# Patient Record
Sex: FEMALE | Employment: FULL TIME | URBAN - METROPOLITAN AREA
[De-identification: names, ages, dates, MRNs, and addresses within clinical notes are randomized per-mention and may not be internally consistent; named-entity substitution may affect disease eponyms.]

---

## 2017-11-08 ENCOUNTER — OFFICE VISIT (OUTPATIENT)
Dept: MEDICAL GROUP | Facility: LAB | Age: 27
End: 2017-11-08
Payer: COMMERCIAL

## 2017-11-08 VITALS
HEART RATE: 66 BPM | OXYGEN SATURATION: 97 % | TEMPERATURE: 97.9 F | SYSTOLIC BLOOD PRESSURE: 96 MMHG | RESPIRATION RATE: 12 BRPM | WEIGHT: 155 LBS | BODY MASS INDEX: 28.52 KG/M2 | HEIGHT: 62 IN | DIASTOLIC BLOOD PRESSURE: 70 MMHG

## 2017-11-08 DIAGNOSIS — Z30.09 COUNSELING FOR BIRTH CONTROL REGARDING INTRAUTERINE DEVICE (IUD): ICD-10-CM

## 2017-11-08 DIAGNOSIS — Z23 NEED FOR INFLUENZA VACCINATION: ICD-10-CM

## 2017-11-08 PROCEDURE — 90471 IMMUNIZATION ADMIN: CPT | Performed by: NURSE PRACTITIONER

## 2017-11-08 PROCEDURE — 99213 OFFICE O/P EST LOW 20 MIN: CPT | Mod: 25 | Performed by: NURSE PRACTITIONER

## 2017-11-08 PROCEDURE — 90686 IIV4 VACC NO PRSV 0.5 ML IM: CPT | Performed by: NURSE PRACTITIONER

## 2017-11-08 ASSESSMENT — PATIENT HEALTH QUESTIONNAIRE - PHQ9: CLINICAL INTERPRETATION OF PHQ2 SCORE: 0

## 2017-11-08 NOTE — PROGRESS NOTES
"Chief Complaint   Patient presents with   • Immunizations   • Other     pt would like to discuss getting a IUD        HPI  Hayde is a 26 yo est female here to discuss possible IUD. She is 2 months postpartum with her first child. She not have any complications with delivery or pregnancy. She has been on a birth control pill in the past and has difficulty remembering it. She would like a Mirena. She has not had a period since childbirth, 2 months ago. She is breast-feeding. She does not smoke cigarettes and is never had a blood clot. Overall feeling well.    Past medical, surgical, family, and social history is reviewed and updated in Epic chart by me today.   Medications and allergies reviewed and updated in Epic chart by me today.     ROS:   As documented in history of present illness above    Exam:  Blood pressure (!) 96/70, pulse 66, temperature 36.6 °C (97.9 °F), resp. rate 12, height 1.575 m (5' 2\"), weight 70.3 kg (155 lb), SpO2 97 %.  Gen. appears healthy in no distress   Skin appropriate for sex and age   HEENT unremarkable   Neck no adenopathy, no nodules or masses palpable   Lungs clear   Heart regular   Extremities no edema   Neuro gait and station normal   Psych appropriate    A/P:  1. Counseling for birth control regarding intrauterine device (IUD)  -Pt counseled on an IUD. Risks, benefits and complications from IUD/hormone use reviewed. Failure rates discussed. Back up contraception and STD prevention discussed. She does not desire her second pregnancy for another 2-3 years. We'll obtain prior authorization if needed by her insurance and bring her back in 1-2 weeks at the end of the day for IUD insertion.    2. Need for influenza vaccination  I have placed the below orders and discussed them with Dr. Reynolds. the MA is performing the below orders under the direction of Dr. BULLOCK  - INFLUENZA VACCINE QUAD INJ >3Y(PF)    "

## 2017-11-08 NOTE — LETTER
[facility]  Tamika Parrish, A.P.N.  [address]  [place] [address]  Fax: [phone]   Authorization for Release/Disclosure of   Protected Health Information   Name: BLESSING KNOWLES : 1990 SSN: [id number]   Address: [address] [place] [address]  [place] [address] Phone:    [phone] (home)    I authorize the entity listed below to release/disclose the PHI below to:   [facility]/Tamika Parrish, A.P.N. and Tamika Parrish A.P.N.   Provider or Entity Name:  [facility]wives - Elizabeth Eddie   Address   City, [facility], [facility]   Phone:      Fax:     Reason for request: continuity of care   Information to be released:    [  ] LAST COLONOSCOPY,  including any PATH REPORT and follow-up  [  ] LAST FIT/COLOGUARD RESULT [  ] LAST DEXA  [  ] LAST MAMMOGRAM  [ x ] LAST PAP  [  ] LAST LABS [  ] RETINA EXAM REPORT  [  ] IMMUNIZATION RECORDS  [  ] Release all info      [  ] Check here and initial the line next to each item to release ALL health information INCLUDING  _____ Care and treatment for drug and / or alcohol abuse  _____ HIV testing, infection status, or AIDS  _____ Genetic Testing    DATES OF SERVICE OR TIME PERIOD TO BE DISCLOSED: _____________  I understand and acknowledge that:  * This Authorization may be revoked at any time by you in writing, except if your health information has already been used or disclosed.  * Your health information that will be used or disclosed as a result of you signing this authorization could be re-disclosed by the recipient. If this occurs, your re-disclosed health information may no longer be protected by State or Federal laws.  * You may refuse to sign this Authorization. Your refusal will not affect your ability to obtain treatment.  * This Authorization becomes effective upon signing and will  on (date) __________.      If no date is indicated, this Authorization will  one (1) year from the signature date.    Name: Blessing  Ani    Signature:   Date:     11/8/2017       PLEASE FAX REQUESTED RECORDS BACK TO: [phone]

## 2017-11-17 ENCOUNTER — TELEPHONE (OUTPATIENT)
Dept: MEDICAL GROUP | Facility: LAB | Age: 27
End: 2017-11-17

## 2017-12-05 ENCOUNTER — OFFICE VISIT (OUTPATIENT)
Dept: MEDICAL GROUP | Facility: LAB | Age: 27
End: 2017-12-05
Payer: COMMERCIAL

## 2017-12-05 VITALS
WEIGHT: 154 LBS | TEMPERATURE: 97.9 F | RESPIRATION RATE: 12 BRPM | HEIGHT: 62 IN | DIASTOLIC BLOOD PRESSURE: 72 MMHG | HEART RATE: 59 BPM | OXYGEN SATURATION: 98 % | SYSTOLIC BLOOD PRESSURE: 98 MMHG | BODY MASS INDEX: 28.34 KG/M2

## 2017-12-05 DIAGNOSIS — Z30.430 ENCOUNTER FOR IUD INSERTION: ICD-10-CM

## 2017-12-05 PROCEDURE — 58300 INSERT INTRAUTERINE DEVICE: CPT | Performed by: NURSE PRACTITIONER

## 2017-12-06 NOTE — PROGRESS NOTES
"Chief Complaint   Patient presents with   • Contraception     IUD placement      HPI:  Hayde is a 26 yo est female here for IUD placement.  Does not desire pregnancy for another 3 years.  LMP was prior to last pregnancy - 3 months postpartum.  Feeling well today.     Consent:Counseled regarding use, risks, and benefits of IUD., Patient read and understands the consent booklet., Procedure explained., Signed informed consent obtained.    OBJECTIVE:  Blood pressure (!) 98/72, pulse (!) 59, temperature 36.6 °C (97.9 °F), resp. rate 12, height 1.575 m (5' 2\"), weight 69.9 kg (154 lb), SpO2 98 %.    Pregnancy test negative.    Gen:  WD, WN, NAD  Abd: abdomen is soft without significant tenderness, masses, organomegaly or guarding.,   Pelvic exam: normal external genitalia, vulva, vagina, cervix, uterus and adnexa    Procedure note:   Mirena IUD insertion: Cervix cleansed with Betadine, Uterus sounded to 7, IUD inserted without difficulty, String visible and trimmed., Patient tolerated procedure excellently without reported pain - minimal cramping only, Lot # [id number], Expiration date 12/2019    A/P:  #1- IUD insertion: see Procedure note above. Follow-up 8 weeks for string check and to see how she's doing with IUD. She understands she may call at anytime with any concerns or problems. Discussed using backup method of contraception for the next month.  "

## 2018-01-15 DIAGNOSIS — Z97.5 BREAKTHROUGH BLEEDING WITH IUD: ICD-10-CM

## 2018-01-15 DIAGNOSIS — N92.1 BREAKTHROUGH BLEEDING WITH IUD: ICD-10-CM

## 2018-01-15 RX ORDER — NAPROXEN 500 MG/1
500 TABLET ORAL 2 TIMES DAILY WITH MEALS
Qty: 10 TAB | Refills: 0 | Status: SHIPPED | OUTPATIENT
Start: 2018-01-15 | End: 2018-01-20

## 2018-02-05 ENCOUNTER — OFFICE VISIT (OUTPATIENT)
Dept: MEDICAL GROUP | Facility: LAB | Age: 28
End: 2018-02-05
Payer: COMMERCIAL

## 2018-02-05 VITALS
OXYGEN SATURATION: 96 % | SYSTOLIC BLOOD PRESSURE: 92 MMHG | HEART RATE: 72 BPM | WEIGHT: 154 LBS | TEMPERATURE: 97.3 F | HEIGHT: 62 IN | BODY MASS INDEX: 28.34 KG/M2 | RESPIRATION RATE: 12 BRPM | DIASTOLIC BLOOD PRESSURE: 68 MMHG

## 2018-02-05 DIAGNOSIS — Z97.5 BREAKTHROUGH BLEEDING WITH IUD: ICD-10-CM

## 2018-02-05 DIAGNOSIS — Z30.431 IUD CHECK UP: ICD-10-CM

## 2018-02-05 DIAGNOSIS — N92.1 BREAKTHROUGH BLEEDING WITH IUD: ICD-10-CM

## 2018-02-05 PROCEDURE — 99213 OFFICE O/P EST LOW 20 MIN: CPT | Performed by: NURSE PRACTITIONER

## 2018-02-05 NOTE — PROGRESS NOTES
"Chief Complaint   Patient presents with   • Contraception     pt states she is having some bleeding everyday, varies in the amount        HPI  Hayde is a 28-year-old established female her for IUD string check. IUD was placed on December 5 and besides bleeding almost every day since, she has not had any other complications that as pelvic pain or dyspareunia. Has had light to clotty type heavy bleeding almost every day since insertion on 12/5/2017.  Currently bleeding lately, necessitating only a thin pad. Rx naproxen but didn't take this because she is breast-feeding. Reports that she really isn't very bothered by the bleeding. She is almost 5 months postpartum.         Past medical, surgical, family, and social history is reviewed and updated in Epic chart by me today.   Medications and allergies reviewed and updated in Epic chart by me today.     ROS:   As documented in history of present illness above    Exam:  Blood pressure (!) 92/68, pulse 72, temperature 36.3 °C (97.3 °F), resp. rate 12, height 1.575 m (5' 2\"), weight 69.9 kg (154 lb), SpO2 96 %.  Constitutional: Alert, no distress, plus 3 vital signs  Skin:  Warm, dry, no rashes invisible areas  Eye: Equal, round and reactive, conjunctiva clear  ENMT: Lips without lesions, good dentition, oropharynx clear    Respiratory: Unlabored respiration, lungs clear to auscultation, no wheezes, no rhonchi  Cardiovascular: Normal rate and rhythm, no murmur, no edema  Abdomen: Soft, nontender  : Strings are easily visualized. Scant amount of dark blood in her vaginal vault and from cervical os. No adnexal tenderness on exam.  Psych: Alert, pleasant, well-groomed, normal affect    A/P:  1. Breakthrough bleeding with IUD  -Discussed various treatment options with the patient including a brief course of NSAIDs, 1-3 cycles of Micronor or a consult with gynecology, all of which she declines. Discussed that it is very common to have bleeding in the first 3-6 months with " IUD and she verbalized understanding. She will update me monthly , especially if she changes her mind regarding treatment for the bleeding.    2. IUD check up

## 2018-09-24 ENCOUNTER — APPOINTMENT (OUTPATIENT)
Dept: MEDICAL GROUP | Facility: LAB | Age: 28
End: 2018-09-24
Payer: COMMERCIAL

## 2018-09-27 ENCOUNTER — HOSPITAL ENCOUNTER (OUTPATIENT)
Facility: MEDICAL CENTER | Age: 28
End: 2018-09-27
Attending: NURSE PRACTITIONER
Payer: COMMERCIAL

## 2018-09-27 ENCOUNTER — OFFICE VISIT (OUTPATIENT)
Dept: MEDICAL GROUP | Facility: LAB | Age: 28
End: 2018-09-27
Payer: COMMERCIAL

## 2018-09-27 VITALS
HEIGHT: 62 IN | HEART RATE: 80 BPM | WEIGHT: 159 LBS | OXYGEN SATURATION: 95 % | SYSTOLIC BLOOD PRESSURE: 100 MMHG | DIASTOLIC BLOOD PRESSURE: 68 MMHG | TEMPERATURE: 97.9 F | BODY MASS INDEX: 29.26 KG/M2 | RESPIRATION RATE: 12 BRPM

## 2018-09-27 DIAGNOSIS — Z01.419 ENCOUNTER FOR GYNECOLOGICAL EXAMINATION: ICD-10-CM

## 2018-09-27 DIAGNOSIS — Z00.00 PREVENTATIVE HEALTH CARE: ICD-10-CM

## 2018-09-27 DIAGNOSIS — Z23 NEED FOR INFLUENZA VACCINATION: ICD-10-CM

## 2018-09-27 DIAGNOSIS — Z12.4 SCREENING FOR MALIGNANT NEOPLASM OF CERVIX: ICD-10-CM

## 2018-09-27 PROCEDURE — 99395 PREV VISIT EST AGE 18-39: CPT | Mod: 25 | Performed by: NURSE PRACTITIONER

## 2018-09-27 PROCEDURE — 88175 CYTOPATH C/V AUTO FLUID REDO: CPT

## 2018-09-27 PROCEDURE — 90471 IMMUNIZATION ADMIN: CPT | Performed by: NURSE PRACTITIONER

## 2018-09-27 PROCEDURE — 90686 IIV4 VACC NO PRSV 0.5 ML IM: CPT | Performed by: NURSE PRACTITIONER

## 2018-09-27 ASSESSMENT — PATIENT HEALTH QUESTIONNAIRE - PHQ9: CLINICAL INTERPRETATION OF PHQ2 SCORE: 0

## 2018-09-27 NOTE — PROGRESS NOTES
HPI:  Hayde is a 28 y.o.  female who presents for annual exam. Generally the patient is feeling good. She has no complaints or concerns.  Doing well with IUD.  Regarding her health maintenance:   Last pap:  1 yr ago  Abnormal Pap hx: none  Periods: random spotting  Contraception:  mirena  Last Mammo:not applicable   Last colonoscopy:not applicable   Bone density test:N\A   Last Lab: due for follow up   Last Td:current   Influenza vaccination:current   Pneumococcal vaccination:not applicable   Zostavax:not applicable   Hx STD''s: no   Regular exercise: yes      meds:   No current outpatient prescriptions on file.     No current facility-administered medications for this visit.        Allergies: No Known Allergies    family:   Family History   Problem Relation Age of Onset   • Diabetes Paternal Grandmother    • Cancer Paternal Grandmother         pancreatic   • Diabetes Paternal Grandfather        social hx:   Social History     Social History   • Marital status:      Spouse name: N/A   • Number of children: N/A   • Years of education: N/A     Occupational History   • Not on file.     Social History Main Topics   • Smoking status: Never Smoker   • Smokeless tobacco: Never Used   • Alcohol use No   • Drug use: No   • Sexual activity: Yes      Comment: one child (boy);  ; not working     Other Topics Concern   • Not on file     Social History Narrative   • No narrative on file       ROS:  No fever, chills, sweats.   No polydipsia, polyuria, temperature intolerance, significant weight changes   No visual changes, blurred vision.  No chest pain, palpitations, peripheral swelling   No chronic cough, shortness of breath, dyspnea with exertion.   No dysphagia, odynophagia, black or bloody stools.   No abdominal pain, nausea, persistent diarrhea, constipation   No dysuria, hematuria, incontinence. Denies nocturia  No rash, pruritis, pigment changes.   No focal weakness, syncope, headache, confusion,  "persistent numbness.   All other systems are reviewed and negative.    PHYSICAL EXAMINATION:  Blood pressure 100/68, pulse 80, temperature 36.6 °C (97.9 °F), resp. rate 12, height 1.575 m (5' 2\"), weight 72.1 kg (159 lb), SpO2 95 %.    General appearance:healthy, well developed, well nourished  Psych: alert, no distress, cooperative  Eyes: EOM's normal, pupils equal, round, reactive to light  ENT: Ears: external ears normal to inspection and palpation, TM's clear, Nose/Sinuses: nose shows no deformity, asymmetry, or inflammation  Neck: no asymmetry, masses, or scars, no adenopathy, thyroid normal to palpation  Lungs:chest symmetric with normal A/P diameter, no chest deformities noted, normal respiratory rate and rhythm  Cardiovascular:regular rate and rhythm, S1 normal  Breasts: normal in size and symmetry, skin normal, physiologic fibronodularity  Abdomen: umbilicus normal, no masses palpable, no organomegaly  Musculoskeletal:ROM of all joints is normal, no evidence of joint instability  Lymphatic: None significantly enlarged  Skin: no rash, no edema  Neuro: mental status intact, cranial nerves 2-12 intact  Pelvic: external genitalia normal, cervix normal in appearance, IUD strings easily visualized, bimanual exam reveals normal uterus, adnexa without masses or tenderness, vaginal mucosa normal      ASSESSMENT/PLAN:  1.annual physical exam: HCM:  Pap and breast exams done.  BSE technique reviewed and patient encouraged to perform self-exam monthly.   Encourage monthly self breast exam  Encourage daily exercise for at least 30 minutes  Recommend 1500 mg Calcium with 600 units vit d daily.    Pap q 3 years if todays is normal.   Recommend CBC, CMP, TSH, LP - fasting.  "

## 2018-09-28 DIAGNOSIS — Z12.4 SCREENING FOR MALIGNANT NEOPLASM OF CERVIX: ICD-10-CM

## 2018-09-28 LAB — CYTOLOGY REG CYTOL: NORMAL
